# Patient Record
Sex: MALE | Race: WHITE | NOT HISPANIC OR LATINO | Employment: FULL TIME | URBAN - METROPOLITAN AREA
[De-identification: names, ages, dates, MRNs, and addresses within clinical notes are randomized per-mention and may not be internally consistent; named-entity substitution may affect disease eponyms.]

---

## 2022-01-18 ENCOUNTER — OFFICE VISIT (OUTPATIENT)
Dept: FAMILY MEDICINE CLINIC | Facility: CLINIC | Age: 47
End: 2022-01-18

## 2022-01-18 ENCOUNTER — LAB (OUTPATIENT)
Dept: LAB | Facility: HOSPITAL | Age: 47
End: 2022-01-18

## 2022-01-18 VITALS
OXYGEN SATURATION: 97 % | TEMPERATURE: 97.1 F | DIASTOLIC BLOOD PRESSURE: 82 MMHG | HEART RATE: 92 BPM | BODY MASS INDEX: 34.17 KG/M2 | SYSTOLIC BLOOD PRESSURE: 144 MMHG | HEIGHT: 65 IN | WEIGHT: 205.1 LBS

## 2022-01-18 DIAGNOSIS — Z11.59 ENCOUNTER FOR HEPATITIS C SCREENING TEST FOR LOW RISK PATIENT: ICD-10-CM

## 2022-01-18 DIAGNOSIS — N52.9 ERECTILE DYSFUNCTION, UNSPECIFIED ERECTILE DYSFUNCTION TYPE: ICD-10-CM

## 2022-01-18 DIAGNOSIS — F43.9 STRESS: ICD-10-CM

## 2022-01-18 DIAGNOSIS — Z72.0 TOBACCO USE: Primary | ICD-10-CM

## 2022-01-18 DIAGNOSIS — Z13.220 SCREENING FOR LIPID DISORDERS: ICD-10-CM

## 2022-01-18 DIAGNOSIS — Z12.5 SCREENING FOR PROSTATE CANCER: ICD-10-CM

## 2022-01-18 DIAGNOSIS — Z00.00 ANNUAL PHYSICAL EXAM: ICD-10-CM

## 2022-01-18 DIAGNOSIS — H91.93 BILATERAL HEARING LOSS, UNSPECIFIED HEARING LOSS TYPE: ICD-10-CM

## 2022-01-18 LAB
ALBUMIN SERPL-MCNC: 4.3 G/DL (ref 3.5–5.2)
ALBUMIN/GLOB SERPL: 1.4 G/DL
ALP SERPL-CCNC: 109 U/L (ref 39–117)
ALT SERPL W P-5'-P-CCNC: 24 U/L (ref 1–41)
ANION GAP SERPL CALCULATED.3IONS-SCNC: 10.2 MMOL/L (ref 5–15)
AST SERPL-CCNC: 29 U/L (ref 1–40)
BASOPHILS # BLD AUTO: 0.08 10*3/MM3 (ref 0–0.2)
BASOPHILS NFR BLD AUTO: 1.1 % (ref 0–1.5)
BILIRUB SERPL-MCNC: 0.5 MG/DL (ref 0–1.2)
BILIRUB UR QL STRIP: NEGATIVE
BUN SERPL-MCNC: 5 MG/DL (ref 6–20)
BUN/CREAT SERPL: 5.5 (ref 7–25)
CALCIUM SPEC-SCNC: 9.3 MG/DL (ref 8.6–10.5)
CHLORIDE SERPL-SCNC: 99 MMOL/L (ref 98–107)
CHOLEST SERPL-MCNC: 169 MG/DL (ref 0–200)
CLARITY UR: ABNORMAL
CO2 SERPL-SCNC: 28.8 MMOL/L (ref 22–29)
COLOR UR: YELLOW
CREAT SERPL-MCNC: 0.91 MG/DL (ref 0.76–1.27)
DEPRECATED RDW RBC AUTO: 38.8 FL (ref 37–54)
EOSINOPHIL # BLD AUTO: 0.44 10*3/MM3 (ref 0–0.4)
EOSINOPHIL NFR BLD AUTO: 6.1 % (ref 0.3–6.2)
ERYTHROCYTE [DISTWIDTH] IN BLOOD BY AUTOMATED COUNT: 12.6 % (ref 12.3–15.4)
GFR SERPL CREATININE-BSD FRML MDRD: 90 ML/MIN/1.73
GLOBULIN UR ELPH-MCNC: 3.1 GM/DL
GLUCOSE SERPL-MCNC: 96 MG/DL (ref 65–99)
GLUCOSE UR STRIP-MCNC: NEGATIVE MG/DL
HCT VFR BLD AUTO: 48.3 % (ref 37.5–51)
HCV AB SER DONR QL: REACTIVE
HDLC SERPL-MCNC: 28 MG/DL (ref 40–60)
HGB BLD-MCNC: 16.1 G/DL (ref 13–17.7)
HGB UR QL STRIP.AUTO: NEGATIVE
IMM GRANULOCYTES # BLD AUTO: 0.03 10*3/MM3 (ref 0–0.05)
IMM GRANULOCYTES NFR BLD AUTO: 0.4 % (ref 0–0.5)
KETONES UR QL STRIP: NEGATIVE
LDLC SERPL CALC-MCNC: 81 MG/DL (ref 0–100)
LDLC/HDLC SERPL: 2.41 {RATIO}
LEUKOCYTE ESTERASE UR QL STRIP.AUTO: NEGATIVE
LYMPHOCYTES # BLD AUTO: 1.68 10*3/MM3 (ref 0.7–3.1)
LYMPHOCYTES NFR BLD AUTO: 23.4 % (ref 19.6–45.3)
MCH RBC QN AUTO: 28.9 PG (ref 26.6–33)
MCHC RBC AUTO-ENTMCNC: 33.3 G/DL (ref 31.5–35.7)
MCV RBC AUTO: 86.6 FL (ref 79–97)
MONOCYTES # BLD AUTO: 0.83 10*3/MM3 (ref 0.1–0.9)
MONOCYTES NFR BLD AUTO: 11.6 % (ref 5–12)
NEUTROPHILS NFR BLD AUTO: 4.12 10*3/MM3 (ref 1.7–7)
NEUTROPHILS NFR BLD AUTO: 57.4 % (ref 42.7–76)
NITRITE UR QL STRIP: NEGATIVE
NRBC BLD AUTO-RTO: 0 /100 WBC (ref 0–0.2)
PH UR STRIP.AUTO: 8.5 [PH] (ref 5–8)
PLATELET # BLD AUTO: 277 10*3/MM3 (ref 140–450)
PMV BLD AUTO: 11 FL (ref 6–12)
POTASSIUM SERPL-SCNC: 4.5 MMOL/L (ref 3.5–5.2)
PROT SERPL-MCNC: 7.4 G/DL (ref 6–8.5)
PROT UR QL STRIP: ABNORMAL
PSA SERPL-MCNC: 2.09 NG/ML (ref 0–4)
RBC # BLD AUTO: 5.58 10*6/MM3 (ref 4.14–5.8)
SODIUM SERPL-SCNC: 138 MMOL/L (ref 136–145)
SP GR UR STRIP: 1.01 (ref 1–1.03)
TESTOST SERPL-MCNC: 270 NG/DL (ref 249–836)
TRIGL SERPL-MCNC: 367 MG/DL (ref 0–150)
TSH SERPL DL<=0.05 MIU/L-ACNC: 1.91 UIU/ML (ref 0.27–4.2)
UROBILINOGEN UR QL STRIP: ABNORMAL
VLDLC SERPL-MCNC: 60 MG/DL (ref 5–40)
WBC NRBC COR # BLD: 7.18 10*3/MM3 (ref 3.4–10.8)

## 2022-01-18 PROCEDURE — 99204 OFFICE O/P NEW MOD 45 MIN: CPT

## 2022-01-18 PROCEDURE — 36415 COLL VENOUS BLD VENIPUNCTURE: CPT

## 2022-01-18 PROCEDURE — G0103 PSA SCREENING: HCPCS

## 2022-01-18 PROCEDURE — 80061 LIPID PANEL: CPT

## 2022-01-18 PROCEDURE — 86803 HEPATITIS C AB TEST: CPT

## 2022-01-18 PROCEDURE — 81003 URINALYSIS AUTO W/O SCOPE: CPT

## 2022-01-18 PROCEDURE — 84403 ASSAY OF TOTAL TESTOSTERONE: CPT

## 2022-01-18 PROCEDURE — 80050 GENERAL HEALTH PANEL: CPT

## 2022-01-18 RX ORDER — BUPROPION HYDROCHLORIDE 150 MG/1
150 TABLET, EXTENDED RELEASE ORAL 2 TIMES DAILY
Qty: 60 TABLET | Refills: 1 | Status: SHIPPED | OUTPATIENT
Start: 2022-01-18

## 2022-01-18 NOTE — PROGRESS NOTES
Maurice Russo presents to [facility] with complaints of decreased hearing in bilateral ears, wanting to discuss smoking cessation, and also has erectile dysfunction.  He is also here to establish as a new patient.      History of Present Illness  This is a 46-year-old male, no significant past medical history, who presents to the clinic with complaints of decreased hearing in his bilateral ears, he wants to discuss smoking cessation, and he also has some erectile dysfunction.    Decreased hearing bilateral ears: Patient states that he is always been kind of hard of hearing, but this is progressively worse in the past 2 to 3 years.  He states that his children get really angry with him because they have to repeat themselves frequently, he states his girlfriend also gets irritated.  Patient states that he works in a [employer], specifically in the boiler room, and is around loud machinery.  He has not ever wore any ear protection.  Patient would like his hearing checked, and would like to be referred for this.    Smoking cessation: Patient states that his father  of cardiac disease as well as his uncle, who recently passed away on , also  of cardiovascular disease.  He has been smoking since he was 17 years old, currently smokes 1 pack/day, and is wanting to stop.  Patient states that he does have some issues with stress/nerves, and this is why it has been difficult for him to stop in the past.    Erectile dysfunction: Patient states that while having sexual intercourse, he states that he does not last long, this is always been an issue with himself.  His girlfriend is getting more irritated because of this, and he was wondering what we can do to try to assist this.    Patient states that he does not want the flu vaccine, nor the COVID-vaccine at this time.  Patient states that he has had the tetanus shot in the past, and will consider getting this again, but wants to  "think about it.  Patient also states he had a colonoscopy several years ago, but has not had one since.    Past Medical History:   Diagnosis Date   • Deafness      No past surgical history on file.    Social History     Socioeconomic History   • Marital status: Single   Tobacco Use   • Smoking status: Current Every Day Smoker     Packs/day: 1.00     Years: 22.00     Pack years: 22.00     Types: Cigarettes   • Smokeless tobacco: Current User   Vaping Use   • Vaping Use: Former   Substance and Sexual Activity   • Alcohol use: Not Currently   • Drug use: Never   • Sexual activity: Defer       Family History   Problem Relation Age of Onset   • Heart disease Father    • Diabetes Father          Objective   Vital Signs:   /82   Pulse 92   Temp 97.1 °F (36.2 °C)   Ht 165.1 cm (65\")   Wt 93 kg (205 lb 1.6 oz)   SpO2 97%   BMI 34.13 kg/m²     Body mass index is 34.13 kg/m².    All labs, imaging, test results, and specialty provider notes reviewed with patient.       Physical Exam  Vitals reviewed.   Constitutional:       Appearance: Normal appearance.   Cardiovascular:      Rate and Rhythm: Normal rate and regular rhythm.      Pulses: Normal pulses.      Heart sounds: Normal heart sounds.   Pulmonary:      Effort: Pulmonary effort is normal.      Breath sounds: Normal breath sounds.   Neurological:      General: No focal deficit present.      Mental Status: He is alert and oriented to person, place, and time.          Assessment and Plan:  Diagnoses and all orders for this visit:    1. Tobacco use (Primary)  Comments:  We will start patient on Wellbutrin to assist with smoking cessation.  We will see patient back in 1 month to further discuss.  Orders:  -     buPROPion SR (Wellbutrin SR) 150 MG 12 hr tablet; Take 1 tablet by mouth 2 (Two) Times a Day.  Dispense: 60 tablet; Refill: 1    2. Annual physical exam  -     CBC Auto Differential; Future  -     Comprehensive Metabolic Panel; Future  -     TSH Rfx On " Abnormal To Free T4; Future  -     Urinalysis With Culture If Indicated -; Future    3. Encounter for hepatitis C screening test for low risk patient  -     Hepatitis C antibody; Future    4. Screening for lipid disorders  -     Lipid Panel; Future    5. Erectile dysfunction, unspecified erectile dysfunction type  Comments:  We will obtain labs, to include a testosterone level, and can further investigate this or consider medication to assist in future.  Orders:  -     Testosterone; Future    6. Bilateral hearing loss, unspecified hearing loss type  Comments:  Per patient's quest, we will refer him to ENT for hearing study.  Orders:  -     Ambulatory Referral to ENT (Otolaryngology)    7. Stress  Comments:  I am hoping that Wellbutrin will assist with the stress/anxiety that he has been experiencing.  Orders:  -     buPROPion SR (Wellbutrin SR) 150 MG 12 hr tablet; Take 1 tablet by mouth 2 (Two) Times a Day.  Dispense: 60 tablet; Refill: 1    8. Screening for prostate cancer  -     PSA SCREENING; Future      Preventative screenings:  Colon cancer: We will defer at this time, patient to consider when he turns 50.  Lung cancer: We will discuss in future.  Prostate cancer: Ordered today    Lipid panel: Ordered today  Hep C screening: Ordered today    Immunizations:  Flu: Declined  Pneumonia: N/A  Shingles: N/A  Tdap: Declined at this time, patient to consider getting this in future.  COVID: Declined at this time, patient states that his work may require him to get, will discuss in the future.      Follow Up:  Return in about 4 weeks (around 2/15/2022).    Patient was given instructions and counseling regarding his condition or for health maintenance advice. Please see specific information pulled into the AVS if appropriate.

## 2022-01-20 ENCOUNTER — TELEPHONE (OUTPATIENT)
Dept: FAMILY MEDICINE CLINIC | Facility: CLINIC | Age: 47
End: 2022-01-20

## 2022-01-20 DIAGNOSIS — E78.1 HYPERTRIGLYCERIDEMIA: Primary | ICD-10-CM

## 2022-01-20 RX ORDER — CHLORAL HYDRATE 500 MG
2000 CAPSULE ORAL
Qty: 180 CAPSULE | Refills: 1 | Status: SHIPPED | OUTPATIENT
Start: 2022-01-20

## 2022-01-20 NOTE — TELEPHONE ENCOUNTER
----- Message from CHRISTOS Grande sent at 1/19/2022  8:22 AM EST -----  Please let patient know that I received his blood work back.      His testosterone came back normal at 270.    His lipid panel did come back elevated also, specifically his triglycerides, which is the sugary part of his cholesterol.  It came back at 367, which is pretty elevated, we typically like this less than 150.  I would like for him to start on a omega-3 fatty acid, I can call this in for him.    There is also noted to be some trace protein found in his urinalysis, we will need to recheck this again in the future, if still positive, we will need to consider getting this further evaluated.  Nothing to do in the meantime about this though.    He also tested positive for history of hepatitis C, please asked the patient if he knew this.  Not really a lot for us to do at this time, his liver function is working well.    All other lab work was unremarkable.

## 2022-01-20 NOTE — TELEPHONE ENCOUNTER
Called and spoke w/ pt, notified of results.   Agrees to start Omega 3 Fatty Acid supplement. Pt expressed understanding. // VJ Trent please prescribe Omega 3 supplement to pharmacy on file. Thank you